# Patient Record
Sex: FEMALE | Race: WHITE | Employment: FULL TIME | URBAN - METROPOLITAN AREA
[De-identification: names, ages, dates, MRNs, and addresses within clinical notes are randomized per-mention and may not be internally consistent; named-entity substitution may affect disease eponyms.]

---

## 2022-08-26 NOTE — PROGRESS NOTES
ANNUAL EXAM AGES 40-64      Nikia Farnsworth is a No obstetric history on file. ,  50 y.o. female   No LMP recorded. (Menstrual status: IUD). She presents for her annual checkup. She is having  hot flashes . Menstrual status:  Her periods are absent due to IUD. Contraception:  The current method of family planning is  IUD . Mirena inserted 2020. Hormonal status:  She reports no perimenstrual type symptoms. She is having vasomotor symptoms. ` Hot flashes & night sweats. The patient is not using any ERT. Sexual history:  She  reports being sexually active and has had partner(s) who are male. She reports using the following method of birth control/protection: I.U.D..    Medical conditions:  Since her last annual GYN exam about two years ago, she has not the following changes in her health history: none. Pap and Mammogram History:  Her most recent Pap smear was normal, obtained 3/31/2020 year(s) ago. The patient has not had a recent mammogram. 2020 negative. Gyn Flowsheet:  No flowsheet data found. Breast Cancer History: negative. Osteoporosis History:  Family history does include a first or second degree relative with osteopenia or osteoporosis. Mother  A bone density scan was not obtained.     Medical History:  Patient Active Problem List   Diagnosis Code    Episodic mood disorder ([facility]) F39    High blood pressure I10    Iron deficiency anemia D50.9    Pure hypercholesterolemia E78.00    Type 2 diabetes mellitus without complication ([facility]) L53.3     Past Medical History:   Diagnosis Date    Diabetes ([facility] [address]     Frequent UTI     Hypertension     IUD (intrauterine device) in place 2020    Mirena     Past Surgical History:   Procedure Laterality Date    HX  SECTION       OB History    Para Term  AB Living   1 1 1 0 0 1   SAB IAB Ectopic Molar Multiple Live Births   0 0 0 0 0 1      # Outcome Date GA Lbr Kevin/2nd Weight Sex Delivery Anes PTL Lv   1 Term 1998   8 lb 8 oz (3.856 kg) M CS-LTranv   JORGE     Social History     Socioeconomic History    Marital status:    Tobacco Use    Smoking status: Some Days     Packs/day: 0.25     Years: 37.00     Pack years: 9.25     Types: Cigarettes    Smokeless tobacco: Never   Vaping Use    Vaping Use: Never used   Substance and Sexual Activity    Alcohol use: Yes     Alcohol/week: 3.0 standard drinks     Types: 3 Glasses of wine per week    Drug use: Never    Sexual activity: Yes     Partners: Male     Birth control/protection: I.U.D. Family History   Problem Relation Age of Onset    Diabetes Mother     Hypertension Mother     Stroke Mother     Osteoporosis Mother     Hypertension Father     Diabetes Father     Heart Attack Maternal Grandfather      Current Outpatient Medications on File Prior to Visit   Medication Sig Dispense Refill    calcium carbonate (OS-DAVID) 500 mg calcium (1,250 mg) tablet 1 tablet with meals      escitalopram oxalate (LEXAPRO) 10 mg tablet escitalopram 10 mg tablet      ferrous sulfate 325 mg (65 mg iron) tablet 1 tablet      losartan (COZAAR) 25 mg tablet losartan 25 mg tablet      aspirin delayed-release 81 mg tablet Take  by mouth daily. multivitamin (ONE A DAY) tablet Take 1 Tablet by mouth daily. mecobalamin (B12 ACTIVE PO) Take  by mouth. docusate sodium (Stool Softener) 100 mg capsule 1 capsule as needed      norethindrone acetate (AYGESTIN) 5 mg tablet norethindrone acetate 5 mg tablet       No current facility-administered medications on file prior to visit.      Allergies   Allergen Reactions    Amoxicillin Rash and Hives     Other reaction(s): hives       Review of Systems:  History obtained from the patient-negative for:  Constitutional: weight loss, fever, night sweats  HEENT: hearing loss, earache, congestion, snoring, sorethroat  CV: chest pain, palpitations, edema  Resp: cough, shortness of breath, wheezing  Breast: breast lumps, nipple discharge, galactorrhea  GI: change in bowel habits, abdominal pain, black or bloody stools  : frequency, dysuria, hematuria, vaginal discharge  MSK: back pain, joint pain, muscle pain  Skin: itching, rash, hives  Neuro: dizziness, headache, confusion, weakness  Psych: anxiety, depression, change in mood  Heme/lymph: bleeding, bruising, pallor    Physical Exam  Visit Vitals  /85   Ht 5' 4.25\" (1.632 m)   Wt 159 lb 12.8 oz (72.5 kg)   BMI 27.22 kg/m²       Constitutional  Appearance: well-nourished, well developed, alert, in no acute distress    HENT  Head and Face: appears normal    Neck  Inspection/Palpation: normal appearance, no masses or tenderness  Lymph Nodes: no lymphadenopathy present  Thyroid: gland size normal, nontender, no nodules or masses present on palpation    Chest  Respiratory Effort: breathing unlabored  Auscultation: normal breath sounds    Cardiovascular  Heart:   Auscultation: regular rate and rhythm without murmur    Breasts  Inspection of Breasts: breasts symmetrical, no skin changes, no discharge present, nipple appearance inverted bilaterally (no change), no skin retraction present  Palpation of Breasts and Axillae: no masses present on palpation, no breast tenderness  Axillary Lymph Nodes: no lymphadenopathy present    Gastrointestinal  Abdominal Examination: abdomen non-tender to palpation, normal bowel sounds, no masses present  Liver and spleen: no hepatomegaly present, spleen not palpable  Hernias: no hernias identified    Genitourinary  External Genitalia: normal appearance for age, no discharge present, no tenderness present, no inflammatory lesions present, no masses present, no atrophy present  Vagina: normal vaginal vault without central or paravaginal defects, no discharge present, no inflammatory lesions present, no masses present  Bladder: non-tender to palpation  Urethra: appears normal  Cervix: normal IUD string visible  Uterus: normal size, shape and consistency  Adnexa: no adnexal tenderness present, no adnexal masses present  Perineum: perineum within normal limits, no evidence of trauma, no rashes or skin lesions present  Anus: anus within normal limits, no hemorrhoids present  Inguinal Lymph Nodes: no lymphadenopathy present    Skin  General Inspection: no rash, no lesions identified    Neurologic/Psychiatric  Mental Status:  Orientation: grossly oriented to person, place and time  Mood and Affect: mood normal, affect appropriate    Labs:  No results found for this or any previous visit (from the past 12 hour(s)). Assessment:    ICD-10-CM ICD-9-CM    1. Osteoporosis screening  Z13.820 V82.81 DEXA BONE DENSITY STUDY AXIAL      2. Breast cancer screening by mammogram  Z12.31 V76.12 MARTIN MAMMO BI SCREENING INCL CAD      3. Menopausal syndrome  N95.1 627.2 [facility] AND LH      ESTRADIOL      THYROID CASCADE PROFILE      [address] AND LH      ESTRADIOL      THYROID CASCADE PROFILE      4. Routine gynecological examination  Z01.419 V72.31 PAP IG, APTIMA HPV AND RFX 16/18,45 ([id number])          Plan:  Counseled re: diet, exercise, healthy lifestyle  Rec annual mammogram  DEXA ordered  Screening Colonoscopy advised.   Name & Number on tearoff sheet given for Dr. Arnold Gomes and/or Dr. Sandip Marte  Return in about 1 year (around 8/29/2023) for Annual.

## 2022-08-29 ENCOUNTER — OFFICE VISIT (OUTPATIENT)
Dept: OBGYN CLINIC | Age: 52
End: 2022-08-29
Payer: COMMERCIAL

## 2022-08-29 VITALS
SYSTOLIC BLOOD PRESSURE: 126 MMHG | HEIGHT: 64 IN | BODY MASS INDEX: 27.28 KG/M2 | WEIGHT: 159.8 LBS | DIASTOLIC BLOOD PRESSURE: 85 MMHG

## 2022-08-29 DIAGNOSIS — Z13.820 OSTEOPOROSIS SCREENING: Primary | ICD-10-CM

## 2022-08-29 DIAGNOSIS — N95.1 MENOPAUSAL SYNDROME: ICD-10-CM

## 2022-08-29 DIAGNOSIS — Z01.419 ROUTINE GYNECOLOGICAL EXAMINATION: ICD-10-CM

## 2022-08-29 DIAGNOSIS — Z12.31 BREAST CANCER SCREENING BY MAMMOGRAM: ICD-10-CM

## 2022-08-29 PROBLEM — E11.9 TYPE 2 DIABETES MELLITUS WITHOUT COMPLICATION (HCC): Status: ACTIVE | Noted: 2022-08-29

## 2022-08-29 PROBLEM — D50.9 IRON DEFICIENCY ANEMIA: Status: ACTIVE | Noted: 2022-08-29

## 2022-08-29 PROBLEM — E78.00 PURE HYPERCHOLESTEROLEMIA: Status: ACTIVE | Noted: 2022-08-29

## 2022-08-29 PROBLEM — F39 EPISODIC MOOD DISORDER (HCC): Status: ACTIVE | Noted: 2022-08-29

## 2022-08-29 PROCEDURE — 99386 PREV VISIT NEW AGE 40-64: CPT | Performed by: OBSTETRICS & GYNECOLOGY

## 2022-08-29 RX ORDER — LANOLIN ALCOHOL/MO/W.PET/CERES
CREAM (GRAM) TOPICAL
COMMUNITY

## 2022-08-29 RX ORDER — LOSARTAN POTASSIUM 25 MG/1
TABLET ORAL
COMMUNITY

## 2022-08-29 RX ORDER — ASPIRIN 81 MG/1
TABLET ORAL DAILY
COMMUNITY

## 2022-08-29 RX ORDER — BISMUTH SUBSALICYLATE 262 MG
1 TABLET,CHEWABLE ORAL DAILY
COMMUNITY

## 2022-08-29 RX ORDER — DOCUSATE SODIUM 100 MG/1
CAPSULE, LIQUID FILLED ORAL
COMMUNITY

## 2022-08-29 RX ORDER — ESCITALOPRAM OXALATE 10 MG/1
TABLET ORAL
COMMUNITY

## 2022-08-29 RX ORDER — CALCIUM CARBONATE 500(1250)
TABLET ORAL
COMMUNITY

## 2022-08-29 RX ORDER — NORETHINDRONE 5 MG/1
TABLET ORAL
COMMUNITY

## 2022-08-30 LAB
ESTRADIOL SERPL-MCNC: <5 PG/ML
FSH SERPL-ACNC: 82 MIU/ML
LH SERPL-ACNC: 40.9 MIU/ML
TSH SERPL DL<=0.005 MIU/L-ACNC: 1.37 UIU/ML (ref 0.45–4.5)

## 2022-09-04 LAB
CYTOLOGIST CVX/VAG CYTO: ABNORMAL
CYTOLOGY CVX/VAG DOC CYTO: ABNORMAL
CYTOLOGY CVX/VAG DOC THIN PREP: ABNORMAL
CYTOLOGY HISTORY:: ABNORMAL
DX ICD CODE: ABNORMAL
HPV GENOTYPE 18,45: NEGATIVE
HPV I/H RISK 4 DNA CVX QL PROBE+SIG AMP: POSITIVE
HPV16 DNA CVX QL PROBE+SIG AMP: NEGATIVE
Lab: ABNORMAL
Lab: ABNORMAL
OTHER STN SPEC: ABNORMAL
STAT OF ADQ CVX/VAG CYTO-IMP: ABNORMAL

## 2022-09-05 PROBLEM — R87.810 CERVICAL HIGH RISK HPV (HUMAN PAPILLOMAVIRUS) TEST POSITIVE: Status: ACTIVE | Noted: 2022-09-05

## 2023-09-21 ENCOUNTER — TELEPHONE (OUTPATIENT)
Age: 53
End: 2023-09-21

## 2023-09-21 RX ORDER — ESCITALOPRAM OXALATE 10 MG/1
10 TABLET ORAL DAILY
Qty: 30 TABLET | Refills: 3 | Status: SHIPPED | OUTPATIENT
Start: 2023-09-21

## 2023-09-21 NOTE — TELEPHONE ENCOUNTER
PT name and  verified    47 yo last ae 22, next ae 10/6/23    PT calling wanting refill for rx:  escitalopram (LEXAPRO) 10 MG tablet       PT preferred pharmacy:    Address Hours  [address] [facility] [id number]        Thank you

## 2023-10-05 NOTE — PROGRESS NOTES
Holly Gutiérrez is a 48 y.o. female returns for an annual exam     Chief Complaint   Patient presents with    Annual Exam       No LMP recorded. (Menstrual status: IUD). Her periods are absent in flow. Problems: no problems  Birth Control: IUD. Last Pap: abnormal HPV obtained 1 year(s) ago. Repeat 6 months. She does not have a history of HIPOLITO 2, 3 or cervical cancer. Last Mammogram: scheduled for 12/28/2023 in our office. Last Bone Density:  not obtained. Last colonoscopy:  not obtained. Going to talk with PCP about Cologuard. 1. Have you been to the ER, urgent care clinic, or hospitalized since your last visit? No    2. Have you seen or consulted any other health care providers outside of the [address] [place] [address] Avenue since your last visit? No    Examination chaperoned by Deonna Garcia CMA.

## 2023-10-06 ENCOUNTER — OFFICE VISIT (OUTPATIENT)
Age: 53
End: 2023-10-06
Payer: COMMERCIAL

## 2023-10-06 VITALS
HEIGHT: 64 IN | BODY MASS INDEX: 27.01 KG/M2 | WEIGHT: 158.2 LBS | SYSTOLIC BLOOD PRESSURE: 144 MMHG | DIASTOLIC BLOOD PRESSURE: 91 MMHG

## 2023-10-06 DIAGNOSIS — Z01.419 WELL WOMAN EXAM WITH ROUTINE GYNECOLOGICAL EXAM: Primary | ICD-10-CM

## 2023-10-06 DIAGNOSIS — E28.39 ESTROGEN DEFICIENCY: ICD-10-CM

## 2023-10-06 PROCEDURE — 3080F DIAST BP >= 90 MM HG: CPT | Performed by: OBSTETRICS & GYNECOLOGY

## 2023-10-06 PROCEDURE — 99396 PREV VISIT EST AGE 40-64: CPT | Performed by: OBSTETRICS & GYNECOLOGY

## 2023-10-06 PROCEDURE — 3077F SYST BP >= 140 MM HG: CPT | Performed by: OBSTETRICS & GYNECOLOGY

## 2023-10-06 RX ORDER — ESCITALOPRAM OXALATE 10 MG/1
10 TABLET ORAL DAILY
Qty: 90 TABLET | Refills: 3 | Status: SHIPPED | OUTPATIENT
Start: 2023-10-06 | End: 2024-09-30

## 2023-10-06 NOTE — PROGRESS NOTES
ANNUAL EXAM AGES 40-64      History:  Abhishek Rojo is a 48y.o. year old  White (non-) female   No LMP recorded. (Menstrual status: IUD). She presents for her annual checkup. No LMP recorded. (Menstrual status: IUD). Her periods are absent in flow. Problems: no problems  Birth Control: IUD. Last Pap: abnormal HPV obtained 1 year(s) ago. Repeat 6 months. She does not have a history of HIPOLITO 2, 3 or cervical cancer. Last Mammogram: scheduled for 2023. Last Bone Density:  not obtained. Last colonoscopy:  not obtained. Going to talk with PCP about Cologuard. Problem List:  Patient Active Problem List    Diagnosis Date Noted    Cervical high risk HPV (human papillomavirus) test positive 2022     Overview Note:     Repap 6 mo        Episodic mood disorder ([address]) 2022    Iron deficiency anemia 2022    Pure hypercholesterolemia 2022    Type 2 diabetes mellitus without complication ([address])     High blood pressure 2016     Past Medical History:   Diagnosis Date    Diabetes ([address])     Frequent UTI     Hypertension     IUD (intrauterine device) in place 2020    Mirena     Past Surgical History:   Procedure Laterality Date     SECTION         OB History    Para Term  AB Living   1 1 1 0 0 1   SAB IAB Ectopic Molar Multiple Live Births   0 0 0 0 0 1      # Outcome Date GA Lbr Melvin/2nd Weight Sex Delivery Anes PTL Lv   1 Term    8 lb 8 oz (3.856 kg) M CS-LTranv   ROXY     Social History     Socioeconomic History    Marital status:      Spouse name: None    Number of children: 1    Years of education: None    Highest education level: None   Tobacco Use    Smoking status: Some Days     Packs/day: 0.25     Years: 38.00     Additional pack years: 0.00     Total pack years: 9.50     Types: Cigarettes, E-Cigarettes    Smokeless tobacco: Never   Substance and Sexual Activity    Alcohol use:  Yes

## 2023-12-28 ENCOUNTER — HOSPITAL ENCOUNTER (OUTPATIENT)
Facility: HOSPITAL | Age: 53
Discharge: HOME | End: 2023-12-28
Attending: OBSTETRICS & GYNECOLOGY
Payer: COMMERCIAL

## 2023-12-28 VITALS — HEIGHT: 65 IN | WEIGHT: 158 LBS | BODY MASS INDEX: 26.33 KG/M2

## 2023-12-28 DIAGNOSIS — E28.39 ESTROGEN DEFICIENCY: ICD-10-CM

## 2023-12-28 PROCEDURE — 77080 DXA BONE DENSITY AXIAL: CPT

## 2024-01-04 DIAGNOSIS — M81.0 AGE-RELATED OSTEOPOROSIS WITHOUT CURRENT PATHOLOGICAL FRACTURE: Primary | ICD-10-CM

## 2024-01-04 RX ORDER — ALENDRONATE SODIUM 70 MG/1
70 TABLET ORAL
Qty: 12 TABLET | Refills: 3 | Status: SHIPPED | OUTPATIENT
Start: 2024-01-04

## 2024-07-12 RX ORDER — ESCITALOPRAM OXALATE 10 MG/1
10 TABLET ORAL DAILY
Qty: 90 TABLET | Refills: 3 | OUTPATIENT
Start: 2024-07-12

## 2024-09-10 ENCOUNTER — TELEPHONE (OUTPATIENT)
Age: 54
End: 2024-09-10

## 2024-09-11 RX ORDER — ESCITALOPRAM OXALATE 10 MG/1
10 TABLET ORAL DAILY
Qty: 30 TABLET | Refills: 0 | Status: SHIPPED | OUTPATIENT
Start: 2024-09-11

## 2024-10-01 RX ORDER — ALENDRONATE SODIUM 70 MG/1
TABLET ORAL
Qty: 12 TABLET | Refills: 3 | OUTPATIENT
Start: 2024-10-01

## 2024-10-01 RX ORDER — ESCITALOPRAM OXALATE 10 MG/1
10 TABLET ORAL DAILY
Qty: 30 TABLET | Refills: 0 | OUTPATIENT
Start: 2024-10-01

## 2024-10-01 RX ORDER — ALENDRONATE SODIUM 70 MG/1
70 TABLET ORAL
Qty: 12 TABLET | Refills: 3 | OUTPATIENT
Start: 2024-10-01

## 2024-10-01 NOTE — TELEPHONE ENCOUNTER
54 year old patient last seen in the office on 10/6/2023      This nurse called the patient and left a message of need for annual exam to be able to get further refills.